# Patient Record
Sex: FEMALE | Race: WHITE
[De-identification: names, ages, dates, MRNs, and addresses within clinical notes are randomized per-mention and may not be internally consistent; named-entity substitution may affect disease eponyms.]

---

## 2023-09-06 ENCOUNTER — HOSPITAL ENCOUNTER (EMERGENCY)
Age: 73
LOS: 1 days | Discharge: HOME | End: 2023-09-07
Payer: MEDICARE

## 2023-09-06 VITALS
TEMPERATURE: 97.7 F | HEART RATE: 92 BPM | OXYGEN SATURATION: 98 % | RESPIRATION RATE: 18 BRPM | DIASTOLIC BLOOD PRESSURE: 88 MMHG | SYSTOLIC BLOOD PRESSURE: 145 MMHG

## 2023-09-06 VITALS — BODY MASS INDEX: 34.4 KG/M2
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
## 2023-09-06 DIAGNOSIS — Z79.899: ICD-10-CM

## 2023-09-06 DIAGNOSIS — S82.145A: Primary | ICD-10-CM

## 2023-09-06 DIAGNOSIS — Z96.653: ICD-10-CM

## 2023-09-06 PROCEDURE — 73562 X-RAY EXAM OF KNEE 3: CPT

## 2023-09-06 PROCEDURE — 99284 EMERGENCY DEPT VISIT MOD MDM: CPT

## 2023-09-06 PROCEDURE — 96372 THER/PROPH/DIAG INJ SC/IM: CPT

## 2023-09-06 NOTE — ED.LOWEXI1
HPI - Extremity Injury (Lower)
General
Chief Complaint: Extremity Injury, Lower
Stated Complaint: LOWER PAIN
Time Seen by Provider: 09/06/23 20:42
Source: patient
Mode of arrival: Wheelchair
Limitations: no limitations
History of Present Illness
HPI Narrative: 
states she was walking and her dogs ran from behind her striking the back of her legs and knocking her down. Past history of bilat TKA. States she injured her right knee. Injury about 45 minutes ago. Denies other injury. Denies weakness of her lower 
extremities. Not able to weight bear on the left leg. No hip or back injury or pain. Did not strike her head or injury her neck. No nausea
MD complaint: Reports knee injury
Related Data
Home Medications

 Medication  Instructions  Recorded  Confirmed
amlodipine 10 mg tablet (Norvasc) 10 mg PO DAILY 09/06/23 09/06/23
atorvastatin 80 mg tablet (Lipitor) 80 mg PO DAILY 09/06/23 09/06/23
calcium carbonate 600 mg calcium 600 mg PO DAILY 09/06/23 09/06/23
(1,500 mg) tablet (Super Calcium)   
cholecalciferol (vitamin D3) 125 5,000 unit PO DAILY 09/06/23 09/06/23
mcg (5,000 unit) tablet (Vitamin   
D3)   
coenzyme Q10 100 mg capsule 200 mg PO DAILY 09/06/23 09/06/23
(CoQ-10)   
furosemide 40 mg tablet 40 mg PO DAILY 09/06/23 09/06/23
magnesium 200 mg tablet 400 mg PO DAILY 09/06/23 09/06/23
mecobalamin (vitamin B12) 5,000 mcg PO 09/06/23 
mcg disintegrating tablet   
sitagliptin phosphate 50 mg tablet 50 mg PO DAILY 09/06/23 09/06/23
(Januvia)   
zolpidem 10 mg tablet (Ambien) 10 mg PO DAILY 09/06/23 09/06/23


Allergies

Allergy/AdvReac Type Severity Reaction Status Date / Time
No Known Drug Allergies Allergy   Verified 09/06/23 20:36



Review of Systems
ROS  
 Status of ROS 10 or more systems reviewed and unremarkable except as noted in history and below   

Exam
Constitutional
Vital Signs, click to edit/add: 

Last Vital Signs

Temp  97.7 F   09/06/23 20:32
Pulse  92 H  09/06/23 20:32
Resp  18   09/06/23 20:32
BP  145/88 H  09/06/23 20:32
Pulse Ox  98   09/06/23 20:32
O2 Del Method  Room Air  09/06/23 20:32



Common normals: oriented x3, healthy appearing and alert
Eye
Common normals: EOMs intact bilaterally and conjunctivae normal
Respiratory
Common normals: normal respiratory effort, no retractions, no use of accessory muscles and clear to auscultation bilaterally
Cardio
Common normals: regular rate, regular rhythm, S1 normal heart sound and S2 normal heart sound
GI
Common normals: Normal to inspection, nondistended, normoactive bowel sounds present, soft to palpation and non-tender
Extremity
Other: 
effusion left knee. Gen. tenderness-mod-left knee. very limited ROM left knee
Neuro
Common normals: oriented x3, CN's II-XII intact bilaterally and no focal motor deficits
Psych
Appearance: grossly normal

Course
Vital Signs
Vital signs: 

Vital Signs

Temperature  97.7 F   09/06/23 20:32
Pulse Rate  92 H  09/06/23 20:32
Respiratory Rate  18   09/06/23 20:32
Blood Pressure  145/88 H  09/06/23 20:32
Pulse Oximetry  98   09/06/23 20:32
Oxygen Delivery Method  Room Air  09/06/23 20:32



Temperature  97.7 F   09/06/23 20:32
Pulse Rate  92 H  09/06/23 20:32
Respiratory Rate  18   09/06/23 20:32
Blood Pressure  145/88 H  09/06/23 20:32
Pulse Oximetry  98   09/06/23 20:32
Oxygen Delivery Method  Room Air  09/06/23 20:32




MDM - Extremity Injury (Lower)
MDM Narrative
Medical decision making narrative: 
patient presents after she was struck from behind her legs by her dogs. She sustained a nondisplaced tibia plateau  fracture. She describes throbbing pain. she has an orthopedic surgeon who performed both of her past TKA. her pain improved enough 
where she was comfortable going home with family . Did offer to transfer her to her physician's hospital if she felt she could not go home. She and her family preferred to go home and follow up as an out patient. Advised non weight bearing. 
Discharged home with Percocet

Discharge Plan
Discharge
Chief Complaint: Extremity Injury, Lower

Clinical Impression:
 Closed fracture of left tibial plateau


Patient Disposition: Home, Self-Care

Condition: Fair

Mode of Transportation: Private Vehicle

Prescriptions / Home Meds:
No Action
  atorvastatin [Lipitor] 80 mg tablet 
   80 mg PO DAILY 
  amlodipine [Norvasc] 10 mg tablet 
   10 mg PO DAILY 
  Januvia 50 mg tablet 
   50 mg PO DAILY 
  furosemide 40 mg tablet 
   40 mg PO DAILY 
  zolpidem [Ambien] 10 mg tablet 
   10 mg PO DAILY 
  mecobalamin (vitamin B12) 5,000 mcg tablet,disintegrating 
     PO  
  calcium carbonate [Super Calcium] 600 mg calcium (1,500 mg) tablet 
   600 mg PO DAILY 
  magnesium 200 mg tablet 
   400 mg PO DAILY 
  cholecalciferol (vitamin D3) [Vitamin D3] 125 mcg (5,000 unit) tablet 
   5,000 unit PO DAILY 
  coenzyme Q10 [CoQ-10] 100 mg capsule 
   200 mg PO DAILY 

Instructions:  Closed Reduction Internal Fixation of Leg Fracture in Adults (DC)

Additional Instructions:
follow up with your orthopedic surgeon in the next couple of days. No weight bearing

Stand Alone Forms:  Portal Instructions

Referrals:
Physician,Non-Staff, MD [Primary Care Provider] - 1 week

Discharge Date/Time: 09/07/23 01:54

## 2023-09-06 NOTE — XR_ITS
[address] [address] 
     [phone] 
  
  
Patient Name: 
LUPE HATHAWAY 
  
MRN: [id number]    YOB: 1950    Sex: F 
Assigned Patient Location: ER 
Current Patient Location:  
Accession/Order Number: [id number] 
Exam Date: 9/06/2023  20:58    Report Date: 9/06/2023  21:21 
  
At the request of: 
MERON PIERCE   
  
Procedure:  XR knee LT 3V 
  
EXAM: XR knee LT 3V  
  
HISTORY: Fall 
  
COMPARISON: None.  
  
TECHNIQUE: 4 views 
  
FINDINGS: IMPRESSION:  
  
Patient is status post total knee replacement arthroplasty. Periprosthetic  
intra-articular fracture of the medial aspect of the tibial plateau. No  
prosthetic displacement. Large lipohemarthrosis. Superficial subcutaneous soft  
  
tissue edema. 
  
  
Electronically authenticated by: GAYLE BROOKS   Date: 9/06/2023  21:21

## 2025-04-09 ENCOUNTER — HOSPITAL ENCOUNTER (EMERGENCY)
Age: 75
Discharge: HOME | End: 2025-04-09
Payer: MEDICARE

## 2025-04-09 VITALS
TEMPERATURE: 98 F | SYSTOLIC BLOOD PRESSURE: 128 MMHG | HEART RATE: 56 BPM | DIASTOLIC BLOOD PRESSURE: 76 MMHG | OXYGEN SATURATION: 98 %

## 2025-04-09 VITALS — BODY MASS INDEX: 30.5 KG/M2
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
## 2025-04-09 DIAGNOSIS — Z23: ICD-10-CM

## 2025-04-09 DIAGNOSIS — Z79.02: ICD-10-CM

## 2025-04-09 DIAGNOSIS — S81.812A: Primary | ICD-10-CM

## 2025-04-09 PROCEDURE — 81001 URINALYSIS AUTO W/SCOPE: CPT

## 2025-04-09 PROCEDURE — 90471 IMMUNIZATION ADMIN: CPT

## 2025-04-09 PROCEDURE — 99284 EMERGENCY DEPT VISIT MOD MDM: CPT

## 2025-04-09 PROCEDURE — 12002 RPR S/N/AX/GEN/TRNK2.6-7.5CM: CPT

## 2025-04-09 PROCEDURE — 90715 TDAP VACCINE 7 YRS/> IM: CPT
